# Patient Record
Sex: FEMALE | Race: WHITE | NOT HISPANIC OR LATINO | URBAN - METROPOLITAN AREA
[De-identification: names, ages, dates, MRNs, and addresses within clinical notes are randomized per-mention and may not be internally consistent; named-entity substitution may affect disease eponyms.]

---

## 2019-02-15 ENCOUNTER — EMERGENCY (EMERGENCY)
Facility: HOSPITAL | Age: 69
LOS: 1 days | Discharge: HOME | End: 2019-02-15
Attending: EMERGENCY MEDICINE | Admitting: EMERGENCY MEDICINE
Payer: MEDICARE

## 2019-02-15 VITALS — WEIGHT: 244.05 LBS

## 2019-02-15 VITALS
SYSTOLIC BLOOD PRESSURE: 152 MMHG | HEART RATE: 76 BPM | OXYGEN SATURATION: 97 % | DIASTOLIC BLOOD PRESSURE: 80 MMHG | TEMPERATURE: 98 F | RESPIRATION RATE: 16 BRPM

## 2019-02-15 DIAGNOSIS — Z96.60 PRESENCE OF UNSPECIFIED ORTHOPEDIC JOINT IMPLANT: Chronic | ICD-10-CM

## 2019-02-15 DIAGNOSIS — Z98.49 CATARACT EXTRACTION STATUS, UNSPECIFIED EYE: Chronic | ICD-10-CM

## 2019-02-15 PROCEDURE — 99284 EMERGENCY DEPT VISIT MOD MDM: CPT

## 2019-02-15 PROCEDURE — 73610 X-RAY EXAM OF ANKLE: CPT

## 2019-02-15 PROCEDURE — 73610 X-RAY EXAM OF ANKLE: CPT | Mod: 26,LT

## 2019-02-15 PROCEDURE — 99284 EMERGENCY DEPT VISIT MOD MDM: CPT | Mod: 25

## 2019-02-15 RX ORDER — IBUPROFEN 200 MG
600 TABLET ORAL ONCE
Qty: 0 | Refills: 0 | Status: COMPLETED | OUTPATIENT
Start: 2019-02-15 | End: 2019-02-15

## 2019-02-15 RX ORDER — ACETAMINOPHEN 500 MG
650 TABLET ORAL ONCE
Qty: 0 | Refills: 0 | Status: COMPLETED | OUTPATIENT
Start: 2019-02-15 | End: 2019-02-15

## 2019-02-15 RX ADMIN — Medication 600 MILLIGRAM(S): at 13:26

## 2019-02-15 RX ADMIN — Medication 650 MILLIGRAM(S): at 13:26

## 2019-02-15 NOTE — ED PROVIDER NOTE - MUSCULOSKELETAL, MLM
Spine appears normal, range of motion is not limited, left ankle with ttp over the lateral mall, forefoot tenderness, strength and sensation intact with strong palpable pulses

## 2019-02-15 NOTE — ED PROVIDER NOTE - CLINICAL SUMMARY MEDICAL DECISION MAKING FREE TEXT BOX
67 y/o female who presents to the ED for atraumatic left ankle pain. states she has been taking motrin with no relief. patient reports worsening pain with weight bearing. miminal swelling of ankle with ttp over lateral mall. full strength, sensation and active ROM. will obtain xrays, medicate for pain. suspect sprain, will ace wrap as needed based on imaging - Danyell Watson PA-C

## 2019-02-15 NOTE — ED ADULT NURSE REASSESSMENT NOTE - CONDITION
pt aox4, disch to see ortho feeling better, no numbness, ambulatory good steady gait  pair of crutches ,/improved

## 2019-02-15 NOTE — ED PROVIDER NOTE - PROGRESS NOTE DETAILS
ace wrapped, reviewed findings recommended weight bear as tolerated or use crutches as needed, follow up for persistent pain as may require MRI. - Danyell GRANDAC

## 2019-02-15 NOTE — ED PROVIDER NOTE - OBJECTIVE STATEMENT
left ankle pain for a few days, no injury. 69 y/o female who presents to the ED for atraumatic left ankle pain. states she has been taking motrin with no relief. patient reports worsening pain with weight bearing. patient unsure if she had an inversion injury. no fever/chills/redness. no hx gout.

## 2019-02-15 NOTE — ED PROCEDURE NOTE - ATTENDING CONTRIBUTION TO CARE
I have personally performed a face to face diagnostic evaluation on this patient.  I have reviewed the PA note and agree with the history, exam, and plan of care, except as noted.  History and Exam by me shows I have personally performed a face to face diagnostic evaluation on this patient.  I have reviewed the PA note and agree with the history, exam, and plan of care, except as noted.  History and Exam by me shows  Procedure performed under my general supervision.

## 2019-02-15 NOTE — ED PROVIDER NOTE - NSFOLLOWUPINSTRUCTIONS_ED_ALL_ED_FT
Follow up with your Primary Care Physician within the next 2-3 days  Bring a copy of your test results with you to your appointment  Continue your current medication regimen  Return to the Emergency Room if you experience new or worsening symptoms  Take Motrin 400-600mg every 6 hrs as needed for pain. Take with food   Take Tylenol 650mg every 6 hrs as needed for pain.  Rest. Apply cold pack for 20 minutes multiple times daily. Elevate.

## 2019-02-15 NOTE — ED ADULT NURSE NOTE - NSIMPLEMENTINTERV_GEN_ALL_ED
Implemented All Universal Safety Interventions:  [place] to call system. Call bell, personal items and telephone within reach. Instruct patient to call for assistance. Room bathroom lighting operational. Non-slip footwear when patient is off stretcher. Physically safe environment: no spills, clutter or unnecessary equipment. Stretcher in lowest position, wheels locked, appropriate side rails in place.

## 2019-02-15 NOTE — ED PROVIDER NOTE - CARE PLAN
Principal Discharge DX:	Ankle pain  Assessment and plan of treatment:	Follow up with your Primary Care Physician within the next 2-3 days  Bring a copy of your test results with you to your appointment  Continue your current medication regimen  Return to the Emergency Room if you experience new or worsening symptoms  Take Motrin 400-600mg every 6 hrs as needed for pain. Take with food   Take Tylenol 650mg every 6 hrs as needed for pain.  Rest. Apply cold pack for 20 minutes multiple times daily. Elevate.

## 2019-02-15 NOTE — ED PROVIDER NOTE - ATTENDING CONTRIBUTION TO CARE
I have personally performed a face to face diagnostic evaluation on this patient.  I have reviewed the PA note and agree with the history, exam, and plan of care, except as noted.  History and Exam by me shows  left ankle pain x 3-4 days.  ? injury.  left ankle- no fx

## 2019-02-15 NOTE — ED ADULT NURSE NOTE - PMH
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

## 2019-05-28 ENCOUNTER — CHART COPY (OUTPATIENT)
Age: 69
End: 2019-05-28

## 2020-11-13 ENCOUNTER — EMERGENCY (EMERGENCY)
Facility: HOSPITAL | Age: 70
LOS: 1 days | End: 2020-11-13
Attending: EMERGENCY MEDICINE
Payer: MEDICARE

## 2020-11-13 VITALS
RESPIRATION RATE: 18 BRPM | WEIGHT: 240.08 LBS | OXYGEN SATURATION: 99 % | DIASTOLIC BLOOD PRESSURE: 88 MMHG | HEIGHT: 64 IN | SYSTOLIC BLOOD PRESSURE: 151 MMHG | TEMPERATURE: 96 F | HEART RATE: 75 BPM

## 2020-11-13 VITALS
TEMPERATURE: 98 F | RESPIRATION RATE: 15 BRPM | SYSTOLIC BLOOD PRESSURE: 140 MMHG | HEART RATE: 78 BPM | OXYGEN SATURATION: 98 % | DIASTOLIC BLOOD PRESSURE: 78 MMHG

## 2020-11-13 DIAGNOSIS — Z96.60 PRESENCE OF UNSPECIFIED ORTHOPEDIC JOINT IMPLANT: Chronic | ICD-10-CM

## 2020-11-13 DIAGNOSIS — Z98.49 CATARACT EXTRACTION STATUS, UNSPECIFIED EYE: Chronic | ICD-10-CM

## 2020-11-13 PROCEDURE — 73030 X-RAY EXAM OF SHOULDER: CPT

## 2020-11-13 PROCEDURE — 73080 X-RAY EXAM OF ELBOW: CPT

## 2020-11-13 PROCEDURE — 96374 THER/PROPH/DIAG INJ IV PUSH: CPT | Mod: XU

## 2020-11-13 PROCEDURE — 73030 X-RAY EXAM OF SHOULDER: CPT | Mod: 26,LT

## 2020-11-13 PROCEDURE — 73060 X-RAY EXAM OF HUMERUS: CPT

## 2020-11-13 PROCEDURE — 99284 EMERGENCY DEPT VISIT MOD MDM: CPT

## 2020-11-13 PROCEDURE — 99285 EMERGENCY DEPT VISIT HI MDM: CPT | Mod: 25

## 2020-11-13 PROCEDURE — 73060 X-RAY EXAM OF HUMERUS: CPT | Mod: 26,LT

## 2020-11-13 PROCEDURE — 73080 X-RAY EXAM OF ELBOW: CPT | Mod: 26,LT

## 2020-11-13 PROCEDURE — 23650 CLTX SHO DSLC W/MNPJ WO ANES: CPT | Mod: LT

## 2020-11-13 PROCEDURE — 73020 X-RAY EXAM OF SHOULDER: CPT

## 2020-11-13 PROCEDURE — 96375 TX/PRO/DX INJ NEW DRUG ADDON: CPT | Mod: XU

## 2020-11-13 RX ORDER — ACETAMINOPHEN 500 MG
650 TABLET ORAL ONCE
Refills: 0 | Status: COMPLETED | OUTPATIENT
Start: 2020-11-13 | End: 2020-11-13

## 2020-11-13 RX ORDER — MORPHINE SULFATE 50 MG/1
4 CAPSULE, EXTENDED RELEASE ORAL ONCE
Refills: 0 | Status: DISCONTINUED | OUTPATIENT
Start: 2020-11-13 | End: 2020-11-13

## 2020-11-13 RX ORDER — DIAZEPAM 5 MG
2 TABLET ORAL ONCE
Refills: 0 | Status: DISCONTINUED | OUTPATIENT
Start: 2020-11-13 | End: 2020-11-13

## 2020-11-13 RX ORDER — DIAZEPAM 5 MG
5 TABLET ORAL ONCE
Refills: 0 | Status: DISCONTINUED | OUTPATIENT
Start: 2020-11-13 | End: 2020-11-13

## 2020-11-13 RX ADMIN — MORPHINE SULFATE 4 MILLIGRAM(S): 50 CAPSULE, EXTENDED RELEASE ORAL at 18:00

## 2020-11-13 RX ADMIN — MORPHINE SULFATE 4 MILLIGRAM(S): 50 CAPSULE, EXTENDED RELEASE ORAL at 17:25

## 2020-11-13 RX ADMIN — Medication 2 MILLIGRAM(S): at 17:26

## 2020-11-13 RX ADMIN — Medication 650 MILLIGRAM(S): at 17:25

## 2020-11-13 RX ADMIN — Medication 650 MILLIGRAM(S): at 18:20

## 2020-11-13 NOTE — ED PROVIDER NOTE - OBJECTIVE STATEMENT
68 y/o female with PMHx BIBA due to trip and fall. pt reports she was waking and tripped over an object, she reports she attempted to stop her fall by grabbing on to shelf. pt notes pain to left shoulder. pt describes pain as aching, non-radiating, and currently 10/10. pt denies open wounds, head injury, numbness/weakness, prodromal symptoms, chest pain, SOB, or any other complaints.

## 2020-11-13 NOTE — CONSULT NOTE ADULT - SUBJECTIVE AND OBJECTIVE BOX
69F reports to  ED s/p fall this afternoon onto left arm. PAtient denies any HS/LOC. Denies any numbness tingling.     Vital Signs Last 24 Hrs  T(C): 35.6 (13 Nov 2020 16:25), Max: 35.6 (13 Nov 2020 16:25)  T(F): 96 (13 Nov 2020 16:25), Max: 96 (13 Nov 2020 16:25)  HR: 75 (13 Nov 2020 16:25) (75 - 75)  BP: 151/88 (13 Nov 2020 16:25) (151/88 - 151/88)  BP(mean): --  RR: 18 (13 Nov 2020 16:25) (18 - 18)  SpO2: 99% (13 Nov 2020 16:25) (99% - 99%)    Imaging:   XRay left proximal humerus: Depicts posterior shoulder dislocation, no fracture appreciated      Gen: NAD AAOX3  LUE:  Skin intact  +sensation C5-T1  +nerves anterior interosseus/posterior interosseus/radial/median/ulnar/musculocutaneous  +radial pulse  Soft compartments, - calf tenderness    Secondary Exam: Benign, Skin intact, NTTP along axial spine, SILT throughout, motor grossly intact throughout, no other orthopedic injuries at this time, compartments soft and compressible    Procedure:  Procedure explained and risks, benefits, and alternatives to procedure discussed with patient at bedside. Patient expressed full understanding and all questions were answered. Under aseptic conditions, a hematoma block was administered to the dislocation site using 10cc of 1% lidocaine + 10cc of Normal saline. Closed reduction was performed and abduction sling was applied to the affected extremity. The patient tolerated the procedure well and there we no complications. The patient was neurovascularly intact following reduction. Post-reduction x-rays demonstrated acceptable alignment.

## 2020-11-13 NOTE — ED ADULT NURSE NOTE - CHPI ED NUR SYMPTOMS NEG
no bleeding/no confusion/no fever/no vomiting/no loss of consciousness/no tingling/no weakness/no numbness/no abrasion

## 2020-11-13 NOTE — ED PROVIDER NOTE - PHYSICAL EXAMINATION
Constitutional: Awake, Alert, non-toxic. NAD. Well appearing, well nourished.   HEAD: Normocephalic, atraumatic.   EYES: PERRL, EOM intact, conjunctiva and sclera are clear bilaterally. No raccoon eyes.   ENT: No seymour sign. No rhinorrhea, normal pharynx, patent, no tonsillar exudate or enlargement, mucous membranes pink/moist, no erythema, no drooling or stridor.   NECK: Supple, non-tender  BACK: No midline or paraspinal TTP of cervical/thoracic/lumbar spine, FROM. No ecchymosis or hematomas. no rib TTP.   CARDIOVASCULAR: Normal S1, S2; regular rate and rhythm.  RESPIRATORY: Normal respiratory effort; breath sounds CTAB, no wheezes, rhonchi, or rales. Speaking in full sentences. No accessory muscle use.   ABDOMEN: Soft; non-tender, non-distended.   EXTREMITIES: Full passive and active ROM in all extremities; (+) left mid humerus TTP, (+) minimal shoulder and elbow TTP, ROM intact, no open wounds; distal pulses palpable and symmetric, no edema, no crepitus or step off  SKIN: Warm, dry; good skin turgor, no apparent lesions or rashes, no ecchymosis, brisk capillary refill.  NEURO: A&O x3. Sensory and motor functions are grossly intact. Speech is normal. Appearance and judgement seem appropriate for gender and age. No neurological deficits. Neurovascular sensation intact motor function 5/5 of upper and lower extremities Constitutional: Awake, Alert, non-toxic. NAD. Well appearing, well nourished.   HEAD: Normocephalic, atraumatic.   EYES: PERRL, EOM intact, conjunctiva and sclera are clear bilaterally. No raccoon eyes.   ENT: No seymour sign. No rhinorrhea, normal pharynx, patent, no tonsillar exudate or enlargement, mucous membranes pink/moist, no erythema, no drooling or stridor.   NECK: Supple, non-tender  BACK: No midline or paraspinal TTP of cervical/thoracic/lumbar spine, FROM. No ecchymosis or hematomas. no rib TTP.   CARDIOVASCULAR: Normal S1, S2; regular rate and rhythm.  RESPIRATORY: Normal respiratory effort; breath sounds CTAB, no wheezes, rhonchi, or rales. Speaking in full sentences. No accessory muscle use.   ABDOMEN: Soft; non-tender, non-distended.   EXTREMITIES: Full passive and active ROM in all extremities; (+) left mid humerus TTP, (+) minimal shoulder and elbow TTP, ROM of elbow and wrist intact, no open wounds; distal pulses palpable and symmetric, no edema, no crepitus or step off  SKIN: Warm, dry; good skin turgor, no apparent lesions or rashes, no ecchymosis, brisk capillary refill.  NEURO: A&O x3. Sensory and motor functions are grossly intact. Speech is normal. Appearance and judgement seem appropriate for gender and age. No neurological deficits. Neurovascular sensation intact motor function 5/5 of upper and lower extremities

## 2020-11-13 NOTE — ED PROVIDER NOTE - ATTENDING CONTRIBUTION TO CARE
70 yo female s/p foosh with injury to LUE  pain with movement   no deformity  2+ pulses  sensation intact    xrays

## 2020-11-13 NOTE — ED PROVIDER NOTE - CLINICAL SUMMARY MEDICAL DECISION MAKING FREE TEXT BOX
BIBA due to trip and fall. pt reports she was waking and tripped over an object, she reports she attempted to stop her fall by grabbing on to shelf. c/o left shoulder pain. plan includes xray shoulder r/o fx/dislocation, pain control, re-assess

## 2020-11-13 NOTE — ED PROVIDER NOTE - CARE PROVIDER_API CALL
Grabiel Stephen)  Orthopaedic Surgery Surgery  [address] [place] [address]  Phone: [phone]  Fax: [phone]  Follow Up Time: 4-6 Days

## 2020-11-13 NOTE — ED PROVIDER NOTE - PATIENT PORTAL LINK FT
You can access the FollowMyHealth Patient Portal offered by [facility] by registering at the following website: [email or web address]. By joining [facility]’s FollowMyHealth portal, you will also be able to view your health information using other applications (apps) compatible with our system.

## 2020-11-13 NOTE — ED PROVIDER NOTE - NSFOLLOWUPINSTRUCTIONS_ED_ALL_ED_FT
Follow up with ortho within the week. Return to ED for any worsening pain, numbness/weakness, etc. Continue to use sling    1) Follow-up with Orthopedics, See referred doctor. Call today/next business day for close, prompt follow-up.  2) Return to Emergency room for any worsening or persistent pain, weakness, numbness, fever, color change to extremity, or any other concerning symptoms.  3) Take ibuprofen 600 mg every  6 hours as needed.   4) You can consider discussing with your doctor if physical therapy or further imaging as an MRI may be beneficial.     Shoulder Dislocation    Your shoulder joint is made up of 3 bones:  •The upper arm bone (humerus).      •The shoulder blade (scapula).      •The collarbone (clavicle).      A shoulder dislocation happens when your upper arm bone moves out of its normal place in your shoulder joint.      What are the causes?  This condition is often caused by:  •A fall.      •A hard, direct hit to the shoulder.      •A forceful movement of the shoulder.        What increases the risk?    You are more likely to develop this condition if you play sports.      What are the signs or symptoms?     •Bad shape (deformity) of the shoulder.      •Very bad pain.      •A shoulder that you cannot move.      •Numbness, weakness, or tingling in your neck or down your arm.      •Bruising or swelling around your shoulder.        How is this treated?  This condition is treated with a procedure called a reduction. This is done to place the upper arm bone back in the joint. There are two types of reduction:  •Closed reduction. The upper arm bone is placed back in the joint without surgery. The doctor uses his or her hands to guide the bone back into place.    •Open reduction. Surgery is done to place the upper arm bone back in the joint. This may be needed if:  •You have a weak shoulder joint or weak tissues that connect bones to each other (ligaments).      •You have had more than one shoulder dislocation.      •The nerves or blood vessels around your shoulder have been damaged.        After the procedure, you will wear a brace or sling to prevent the arm from moving.    After the brace or sling is removed, you will have physical therapy to help improve movement (range of motion) in your shoulder joint.      Follow these instructions at home:    Medicines     •Take over-the-counter and prescription medicines only as told by your doctor.    •Ask your doctor if the medicine prescribed to you:   •Requires you to avoid driving or using heavy machinery.     •Can cause trouble pooping (constipation). You may need to take steps to prevent or treat trouble pooping:  •Drink enough fluid to keep your pee (urine) pale yellow.      •Take over-the-counter or prescription medicines.      •Eat foods that are high in fiber. These include beans, whole grains, and fresh fruits and vegetables.       •Limit foods that are high in fat and sugar. These include fried or sweet foods.          If you have a brace or sling:     •Wear the brace or sling as told by your doctor. Remove it only as told by your doctor.    •Loosen the brace or sling if your fingers:  •Tingle.      •Become numb.      •Turn cold or blue.        •Keep the brace or sling clean.    •If the brace or sling is not waterproof:  •Do not let it get wet.      •Cover it with a watertight covering when you take a bath or shower.          Managing pain, stiffness, and swelling    •If told, put ice on the injured area.  •If you can remove your brace or sling, remove it as told by your doctor.      •Put ice in a plastic bag.      •Place a towel between your skin and the bag.      •Leave the ice on for 20 minutes, 2–3 times per day.        •Move your fingers often.      •Raise (elevate) the injured area above the level of your heart while you are sitting or lying down.      Activity     • Do not lift your arm above shoulder level until your doctor approves.      • Do not lift anything until your doctor says that it is safe.      • Do not push or pull things until your doctor approves.      •Return to your normal activities as told by your doctor. Ask your doctor what activities are safe for you.      •Do range-of-motion exercises only as told by your doctor.      •Exercise your hand by squeezing a soft ball. This keeps your hand and wrist from getting stiff and swollen.      General instructions     • Do not drive while you are wearing a brace or sling on a hand that you use for driving.      • Do not take baths, swim, or use a hot tub until your doctor approves. Ask your doctor if you may take showers. You may only be allowed to take sponge baths.      • Do not use any tobacco products, including cigarettes, chewing tobacco, or e-cigarettes. These can delay healing. If you need help quitting, ask your doctor.      •Keep all follow-up visits as told by your doctor. This is important.        Contact a doctor if:    •Your brace or sling gets damaged.        Get help right away if:    •Your pain gets worse, not better.      •You lose feeling in your arm or hand.      •Your arm or hand turns white and cold.        Summary    •A shoulder dislocation happens when your upper arm bone moves out of its normal place in your shoulder joint.      •It is often caused by a fall, a strong hit to the shoulder, or a forceful movement of the shoulder.      •It causes very bad pain. You may not be able to move your shoulder.      •This condition is treated with either closed or open reduction. You will also be given a brace or sling. You will do exercises to improve movement in your shoulder joint.      •Contact a doctor if your brace or sling gets damaged. Get help right away if your pain gets worse, you lose feeling in your arm or hand, or your arm or hand turns white or cold.      This information is not intended to replace advice given to you by your health care provider. Make sure you discuss any questions you have with your health care provider.

## 2020-11-13 NOTE — CONSULT NOTE ADULT - ASSESSMENT
69F with left posterior shoulder dislocation    -Hematoma block preformed and shoulder was relocated at bedside with confirmatory imaging  -Patient was placed in abduction sling  -NWB LUE until seen in office  -Keep LUE in abduction brace  -Ice effected extremity as needed   -OCT NSAID As needed  -FU in Dr. Stephen office in 7 days  -No acute orthopedic surgical intervention needed at this time  -Discussed with attending who agrees with plan  -Ortho stable for discharge

## 2020-11-13 NOTE — ED PROVIDER NOTE - PSH
Axillary cyst removed    Detached retina, right    S/P cataract extraction  bilateral  S/P hip replacement    S/P hip replacement  right 2014  S/P Tubal Ligation

## 2020-11-13 NOTE — ED PROVIDER NOTE - PROGRESS NOTE DETAILS
xray noted shoulder dislocation. Ortho reduced shoulder, advised dc with follow up with dr. Lehman within the week. ER return precautions discussed

## 2020-12-08 ENCOUNTER — APPOINTMENT (OUTPATIENT)
Dept: ORTHOPEDIC SURGERY | Facility: CLINIC | Age: 70
End: 2020-12-08
Payer: MEDICARE

## 2020-12-08 VITALS
HEIGHT: 65 IN | TEMPERATURE: 97.5 F | SYSTOLIC BLOOD PRESSURE: 164 MMHG | BODY MASS INDEX: 38.32 KG/M2 | WEIGHT: 230 LBS | HEART RATE: 80 BPM | DIASTOLIC BLOOD PRESSURE: 124 MMHG

## 2020-12-08 PROCEDURE — 99203 OFFICE O/P NEW LOW 30 MIN: CPT

## 2020-12-09 RX ORDER — ALPRAZOLAM 0.25 MG/1
0.25 TABLET ORAL
Qty: 4 | Refills: 0 | Status: ACTIVE | COMMUNITY
Start: 2020-12-09 | End: 1900-01-01

## 2020-12-12 ENCOUNTER — APPOINTMENT (OUTPATIENT)
Dept: MRI IMAGING | Facility: CLINIC | Age: 70
End: 2020-12-12
Payer: MEDICARE

## 2020-12-12 ENCOUNTER — OUTPATIENT (OUTPATIENT)
Dept: OUTPATIENT SERVICES | Facility: HOSPITAL | Age: 70
LOS: 1 days | End: 2020-12-12
Payer: MEDICARE

## 2020-12-12 DIAGNOSIS — Z96.60 PRESENCE OF UNSPECIFIED ORTHOPEDIC JOINT IMPLANT: Chronic | ICD-10-CM

## 2020-12-12 DIAGNOSIS — M75.122 COMPLETE ROTATOR CUFF TEAR OR RUPTURE OF LEFT SHOULDER, NOT SPECIFIED AS TRAUMATIC: ICD-10-CM

## 2020-12-12 DIAGNOSIS — Z98.49 CATARACT EXTRACTION STATUS, UNSPECIFIED EYE: Chronic | ICD-10-CM

## 2020-12-12 PROCEDURE — 73221 MRI JOINT UPR EXTREM W/O DYE: CPT | Mod: 26,LT

## 2020-12-12 PROCEDURE — 73221 MRI JOINT UPR EXTREM W/O DYE: CPT

## 2020-12-15 ENCOUNTER — APPOINTMENT (OUTPATIENT)
Dept: ORTHOPEDIC SURGERY | Facility: CLINIC | Age: 70
End: 2020-12-15
Payer: MEDICARE

## 2020-12-15 VITALS — SYSTOLIC BLOOD PRESSURE: 186 MMHG | HEART RATE: 86 BPM | DIASTOLIC BLOOD PRESSURE: 79 MMHG | TEMPERATURE: 97.3 F

## 2020-12-15 PROCEDURE — 20610 DRAIN/INJ JOINT/BURSA W/O US: CPT | Mod: LT

## 2020-12-15 PROCEDURE — 99214 OFFICE O/P EST MOD 30 MIN: CPT | Mod: 25

## 2021-01-27 ENCOUNTER — APPOINTMENT (OUTPATIENT)
Dept: ORTHOPEDIC SURGERY | Facility: CLINIC | Age: 71
End: 2021-01-27
Payer: MEDICARE

## 2021-01-27 VITALS — HEIGHT: 65 IN | BODY MASS INDEX: 38.32 KG/M2 | TEMPERATURE: 97.6 F | WEIGHT: 230 LBS

## 2021-01-27 DIAGNOSIS — S46.119A STRAIN OF MUSCLE, FASCIA AND TENDON OF LONG HEAD OF BICEPS, UNSPECIFIED ARM, INITIAL ENCOUNTER: ICD-10-CM

## 2021-01-27 PROCEDURE — 99214 OFFICE O/P EST MOD 30 MIN: CPT

## 2021-02-12 ENCOUNTER — OUTPATIENT (OUTPATIENT)
Dept: OUTPATIENT SERVICES | Facility: HOSPITAL | Age: 71
LOS: 1 days | End: 2021-02-12
Payer: MEDICARE

## 2021-02-12 VITALS
SYSTOLIC BLOOD PRESSURE: 159 MMHG | RESPIRATION RATE: 16 BRPM | TEMPERATURE: 98 F | WEIGHT: 240.08 LBS | DIASTOLIC BLOOD PRESSURE: 65 MMHG | HEIGHT: 63 IN | OXYGEN SATURATION: 100 % | HEART RATE: 74 BPM

## 2021-02-12 DIAGNOSIS — M75.122 COMPLETE ROTATOR CUFF TEAR OR RUPTURE OF LEFT SHOULDER, NOT SPECIFIED AS TRAUMATIC: ICD-10-CM

## 2021-02-12 DIAGNOSIS — Z01.818 ENCOUNTER FOR OTHER PREPROCEDURAL EXAMINATION: ICD-10-CM

## 2021-02-12 DIAGNOSIS — Z98.49 CATARACT EXTRACTION STATUS, UNSPECIFIED EYE: Chronic | ICD-10-CM

## 2021-02-12 DIAGNOSIS — S46.119A STRAIN OF MUSCLE, FASCIA AND TENDON OF LONG HEAD OF BICEPS, UNSPECIFIED ARM, INITIAL ENCOUNTER: ICD-10-CM

## 2021-02-12 DIAGNOSIS — Z96.642 PRESENCE OF LEFT ARTIFICIAL HIP JOINT: Chronic | ICD-10-CM

## 2021-02-12 DIAGNOSIS — Z96.641 PRESENCE OF RIGHT ARTIFICIAL HIP JOINT: Chronic | ICD-10-CM

## 2021-02-12 NOTE — H&P PST ADULT - NSICDXPASTSURGICALHX_GEN_ALL_CORE_FT
PAST SURGICAL HISTORY:  Axillary cyst removed right 1970    Detached retina, right 2011    S/P cataract extraction bilateral 2012    S/P hip replacement, left August 2014    S/P hip replacement, right May,2014    S/P Tubal Ligation 1986

## 2021-02-12 NOTE — H&P PST ADULT - NSICDXFAMILYHX_GEN_ALL_CORE_FT
FAMILY HISTORY:  Father  Still living? No  Family history of MS (multiple sclerosis), Age at diagnosis: Age Unknown    Mother  Still living? Unknown  Family history of dementia, Age at diagnosis: Age Unknown

## 2021-02-12 NOTE — H&P PST ADULT - PRO PAIN EXPRESSION
PROCEDURE:   IUD (Intrauterine device) removal     REASON FOR REMOVAL:       HISTORY AND PRESENT ILLNESS:   Patient Does not have any concerns at point in time.      PROCEDURE NOTE:   IUD removal     INDICATION:       ALLERGIES:   Allergen Reactions   •         MEDICATIONS:    LAST MENSTRUAL PERIOD:   Patient Does have menstrual periods since using the IUD     PROCEDURE:   Procedure discussed with the patient including risks and benefits which include discomfort, pain, infection, bleeding.  She fully understood the procedure and consented for the procedure.    IUD string was visible.  Using the ring forceps, IUD strings were removed without any problems.  The patient tolerated the procedure very well.     What to expect after the procedure was discussed at length with her.  She has full understanding of that.  Advised if she noticed any cramping, she should notify me.  All plans discussed with her.  She understood and agreed.     Follow up and further contraceptive options discussed with the patient.  Patient has decided to go with  method of contraception.  She was provided with condoms for back up for use in the next 7 days.       PROCEDURE:  Uterus examined and noted to be non-enlarged, no adnexal masses noted.  Speculum inserted.  Cervix cleansed with Betadine and cervix grasped with single tooth tenaculum.  Uterus sounded to 6 cm.  Mirena IUD inserted without difficulty under sterile conditions without complication.  Strings cut to 3 cm long.  All instruments removed.  Minimal bleeding from tenaculum sites with good hemostasis.    ASSESSMENT:  Mirena IUD (intrauterine device) placement.   LOT # [id number]  , EXP 05/2022     PLAN:  Instructed on how to check her IUD strings.  Check strings monthly to be reassured her IUD is in the proper position and to call the office if she cannot locate the strings.  Mirena product brochure given.  Schedule follow-up for IUD check in 4-6 weeks.  Instructed to call if having  fever, severe bleeding or severe pain.    [place] CNM     facial expression/grimacing/guarding

## 2021-02-12 NOTE — H&P PST ADULT - HISTORY OF PRESENT ILLNESS
This is a 69 y/o female who injured her left shoulder s/p fall on November,2020 with complaint of  worsening left shoulder pain and pain with ROM. Tried PT and serioid injection with mild relief , however pain continued. MRI showed rotator cuff tear . scheduled for left shoulder arthroscopy 2/26/21 This is a 71 y/o female who injured her left shoulder s/p fall on November,2020 with complaint of  worsening left shoulder pain and pain with ROM. Tried PT and sterioid injection with mild relief , however pain continued. MRI showed rotator cuff tear . scheduled for left shoulder arthroscopy 2/26/21

## 2021-02-12 NOTE — H&P PST ADULT - NSICDXPASTMEDICALHX_GEN_ALL_CORE_FT
PAST MEDICAL HISTORY:  HLD (hyperlipidemia)     HTN (hypertension)      PAST MEDICAL HISTORY:  HLD (hyperlipidemia)     HTN (hypertension)     Morbid obesity with BMI of 40.0-44.9, adult BMI 43

## 2021-02-12 NOTE — H&P PST ADULT - ASSESSMENT
^11 y/o female with left rotator cuff tear   scheduled for left shoulder arthroscopy on 2/26/21   Medical clearance   Pre op instructions   COVID testing on 2/24/21 at 11;30 am

## 2021-02-12 NOTE — H&P PST ADULT - SOURCE OF INFORMATION, PROFILE
medical history obtained via telephone as per COVID protocol. .physical exam to be done on DOS/patient

## 2021-02-12 NOTE — H&P PST ADULT - NSANTHOSAYNRD_GEN_A_CORE
No. BRIT screening performed.  STOP BANG Legend: 0-2 = LOW Risk; 3-4 = INTERMEDIATE Risk; 5-8 = HIGH Risk

## 2021-02-17 PROCEDURE — G0463: CPT

## 2021-02-18 PROBLEM — E66.01 MORBID (SEVERE) OBESITY DUE TO EXCESS CALORIES: Chronic | Status: ACTIVE | Noted: 2021-02-12

## 2021-02-18 PROBLEM — E78.5 HYPERLIPIDEMIA, UNSPECIFIED: Chronic | Status: ACTIVE | Noted: 2021-02-12

## 2021-02-24 ENCOUNTER — OUTPATIENT (OUTPATIENT)
Dept: OUTPATIENT SERVICES | Facility: HOSPITAL | Age: 71
LOS: 1 days | End: 2021-02-24
Payer: MEDICARE

## 2021-02-24 DIAGNOSIS — Z96.641 PRESENCE OF RIGHT ARTIFICIAL HIP JOINT: Chronic | ICD-10-CM

## 2021-02-24 DIAGNOSIS — Z98.49 CATARACT EXTRACTION STATUS, UNSPECIFIED EYE: Chronic | ICD-10-CM

## 2021-02-24 DIAGNOSIS — Z96.642 PRESENCE OF LEFT ARTIFICIAL HIP JOINT: Chronic | ICD-10-CM

## 2021-02-24 DIAGNOSIS — Z20.828 CONTACT WITH AND (SUSPECTED) EXPOSURE TO OTHER VIRAL COMMUNICABLE DISEASES: ICD-10-CM

## 2021-02-24 LAB — SARS-COV-2 RNA SPEC QL NAA+PROBE: SIGNIFICANT CHANGE UP

## 2021-02-24 PROCEDURE — U0003: CPT

## 2021-02-24 PROCEDURE — U0005: CPT

## 2021-02-25 ENCOUNTER — TRANSCRIPTION ENCOUNTER (OUTPATIENT)
Age: 71
End: 2021-02-25

## 2021-02-25 NOTE — ASU PATIENT PROFILE, ADULT - PSH
Axillary cyst removed  right 1970  Detached retina, right  2011  S/P cataract extraction  bilateral 2012  S/P hip replacement, left  August 2014  S/P hip replacement, right  May,2014  S/P Tubal Ligation  1986

## 2021-02-25 NOTE — ASU PATIENT PROFILE, ADULT - ANESTHESIA, PREVIOUS REACTION, PROFILE
Spoke with patient and informed her again that there are no appointments available on the days she requested. Patient was informed that PROZAC was called into the pharmacy for her depression. Patient requesting to discuss an O2 machine. Patient also had questions about labs from other Dr's. Patient was instructed to speak to the Dr at her appointment with concerns about her O2 and to contact the Dr's office's that placed the orders for the labs for the results. Patient placed on wait list for earlier appointment. No further questions or concerns.   none

## 2021-02-26 ENCOUNTER — APPOINTMENT (OUTPATIENT)
Dept: ORTHOPEDIC SURGERY | Facility: HOSPITAL | Age: 71
End: 2021-02-26

## 2021-02-26 ENCOUNTER — RESULT REVIEW (OUTPATIENT)
Age: 71
End: 2021-02-26

## 2021-02-26 ENCOUNTER — OUTPATIENT (OUTPATIENT)
Dept: OUTPATIENT SERVICES | Facility: HOSPITAL | Age: 71
LOS: 1 days | End: 2021-02-26
Payer: MEDICARE

## 2021-02-26 VITALS
HEIGHT: 62 IN | HEART RATE: 74 BPM | DIASTOLIC BLOOD PRESSURE: 65 MMHG | SYSTOLIC BLOOD PRESSURE: 159 MMHG | TEMPERATURE: 98 F | RESPIRATION RATE: 16 BRPM | WEIGHT: 240.08 LBS | OXYGEN SATURATION: 100 %

## 2021-02-26 VITALS
HEART RATE: 61 BPM | OXYGEN SATURATION: 99 % | RESPIRATION RATE: 20 BRPM | SYSTOLIC BLOOD PRESSURE: 108 MMHG | DIASTOLIC BLOOD PRESSURE: 94 MMHG

## 2021-02-26 DIAGNOSIS — Z98.49 CATARACT EXTRACTION STATUS, UNSPECIFIED EYE: Chronic | ICD-10-CM

## 2021-02-26 DIAGNOSIS — Z96.642 PRESENCE OF LEFT ARTIFICIAL HIP JOINT: Chronic | ICD-10-CM

## 2021-02-26 DIAGNOSIS — S46.119A STRAIN OF MUSCLE, FASCIA AND TENDON OF LONG HEAD OF BICEPS, UNSPECIFIED ARM, INITIAL ENCOUNTER: ICD-10-CM

## 2021-02-26 DIAGNOSIS — Z96.641 PRESENCE OF RIGHT ARTIFICIAL HIP JOINT: Chronic | ICD-10-CM

## 2021-02-26 DIAGNOSIS — M75.122 COMPLETE ROTATOR CUFF TEAR OR RUPTURE OF LEFT SHOULDER, NOT SPECIFIED AS TRAUMATIC: ICD-10-CM

## 2021-02-26 PROCEDURE — 29823 SHO ARTHRS SRG XTNSV DBRDMT: CPT | Mod: LT

## 2021-02-26 PROCEDURE — 88304 TISSUE EXAM BY PATHOLOGIST: CPT | Mod: 26

## 2021-02-26 PROCEDURE — 29826 SHO ARTHRS SRG DECOMPRESSION: CPT | Mod: LT

## 2021-02-26 PROCEDURE — 88304 TISSUE EXAM BY PATHOLOGIST: CPT

## 2021-02-26 RX ORDER — SODIUM CHLORIDE 9 MG/ML
1000 INJECTION, SOLUTION INTRAVENOUS
Refills: 0 | Status: DISCONTINUED | OUTPATIENT
Start: 2021-02-26 | End: 2021-02-26

## 2021-02-26 RX ORDER — OXYCODONE AND ACETAMINOPHEN 5; 325 MG/1; MG/1
1 TABLET ORAL ONCE
Refills: 0 | Status: DISCONTINUED | OUTPATIENT
Start: 2021-02-26 | End: 2021-02-26

## 2021-02-26 RX ORDER — HYDROMORPHONE HYDROCHLORIDE 2 MG/ML
0.5 INJECTION INTRAMUSCULAR; INTRAVENOUS; SUBCUTANEOUS
Refills: 0 | Status: DISCONTINUED | OUTPATIENT
Start: 2021-02-26 | End: 2021-02-26

## 2021-02-26 RX ORDER — APREPITANT 80 MG/1
40 CAPSULE ORAL ONCE
Refills: 0 | Status: COMPLETED | OUTPATIENT
Start: 2021-02-26 | End: 2021-02-26

## 2021-02-26 RX ORDER — ACETAMINOPHEN 500 MG
1000 TABLET ORAL ONCE
Refills: 0 | Status: DISCONTINUED | OUTPATIENT
Start: 2021-02-26 | End: 2021-02-26

## 2021-02-26 RX ORDER — CEFAZOLIN SODIUM 1 G
2000 VIAL (EA) INJECTION ONCE
Refills: 0 | Status: COMPLETED | OUTPATIENT
Start: 2021-02-26 | End: 2021-02-26

## 2021-02-26 RX ORDER — OXYCODONE AND ACETAMINOPHEN 5; 325 MG/1; MG/1
1 TABLET ORAL
Qty: 42 | Refills: 0
Start: 2021-02-26 | End: 2021-03-04

## 2021-02-26 RX ORDER — ONDANSETRON 8 MG/1
4 TABLET, FILM COATED ORAL ONCE
Refills: 0 | Status: DISCONTINUED | OUTPATIENT
Start: 2021-02-26 | End: 2021-02-26

## 2021-02-26 RX ORDER — CHLORHEXIDINE GLUCONATE 213 G/1000ML
1 SOLUTION TOPICAL ONCE
Refills: 0 | Status: COMPLETED | OUTPATIENT
Start: 2021-02-26 | End: 2021-02-26

## 2021-02-26 RX ADMIN — APREPITANT 40 MILLIGRAM(S): 80 CAPSULE ORAL at 06:28

## 2021-02-26 RX ADMIN — CHLORHEXIDINE GLUCONATE 1 APPLICATION(S): 213 SOLUTION TOPICAL at 06:28

## 2021-02-26 NOTE — ASU DISCHARGE PLAN (ADULT/PEDIATRIC) - ASU DC SPECIAL INSTRUCTIONSFT
FOLLOW enclosed shoulder arthroscopy brochure.  Call MD for severe pain/fever/chills  Ice to shoulder 20 minutes on and off the first 48 hours after surgery to help decrease pain/swelling  Keep shoulder elevated in sling while out of the house.  May remove while at home and awake.  Flex and extend elbow to prevent stiffness when out of sling.  Pump fists 10x an hour to help with circulation  Your most comfortable sleeping position will be propped up  Pain medication as needed, take a stool softener to decrease constipation

## 2021-03-09 ENCOUNTER — APPOINTMENT (OUTPATIENT)
Dept: ORTHOPEDIC SURGERY | Facility: CLINIC | Age: 71
End: 2021-03-09
Payer: MEDICARE

## 2021-03-09 DIAGNOSIS — M19.011 PRIMARY OSTEOARTHRITIS, RIGHT SHOULDER: ICD-10-CM

## 2021-03-09 PROCEDURE — 99024 POSTOP FOLLOW-UP VISIT: CPT

## 2021-03-09 PROCEDURE — 73030 X-RAY EXAM OF SHOULDER: CPT | Mod: LT

## 2021-03-13 ENCOUNTER — EMERGENCY (EMERGENCY)
Facility: HOSPITAL | Age: 71
LOS: 1 days | Discharge: HOME | End: 2021-03-13
Attending: EMERGENCY MEDICINE | Admitting: EMERGENCY MEDICINE
Payer: MEDICARE

## 2021-03-13 VITALS
TEMPERATURE: 98 F | RESPIRATION RATE: 16 BRPM | DIASTOLIC BLOOD PRESSURE: 75 MMHG | SYSTOLIC BLOOD PRESSURE: 148 MMHG | HEART RATE: 74 BPM | OXYGEN SATURATION: 99 %

## 2021-03-13 VITALS
RESPIRATION RATE: 16 BRPM | HEART RATE: 75 BPM | OXYGEN SATURATION: 100 % | TEMPERATURE: 98 F | WEIGHT: 235.89 LBS | SYSTOLIC BLOOD PRESSURE: 166 MMHG | DIASTOLIC BLOOD PRESSURE: 78 MMHG | HEIGHT: 62 IN

## 2021-03-13 DIAGNOSIS — Z98.49 CATARACT EXTRACTION STATUS, UNSPECIFIED EYE: Chronic | ICD-10-CM

## 2021-03-13 DIAGNOSIS — Z96.641 PRESENCE OF RIGHT ARTIFICIAL HIP JOINT: Chronic | ICD-10-CM

## 2021-03-13 DIAGNOSIS — Z96.642 PRESENCE OF LEFT ARTIFICIAL HIP JOINT: Chronic | ICD-10-CM

## 2021-03-13 PROCEDURE — 93971 EXTREMITY STUDY: CPT

## 2021-03-13 PROCEDURE — 93971 EXTREMITY STUDY: CPT | Mod: 26,LT

## 2021-03-13 PROCEDURE — 99284 EMERGENCY DEPT VISIT MOD MDM: CPT

## 2021-03-13 PROCEDURE — 99284 EMERGENCY DEPT VISIT MOD MDM: CPT | Mod: 25

## 2021-03-13 RX ORDER — IBUPROFEN 200 MG
1 TABLET ORAL
Qty: 40 | Refills: 0
Start: 2021-03-13 | End: 2021-03-22

## 2021-03-13 RX ORDER — CHOLECALCIFEROL (VITAMIN D3) 125 MCG
2 CAPSULE ORAL
Qty: 0 | Refills: 0 | DISCHARGE

## 2021-03-13 RX ORDER — LOSARTAN POTASSIUM 100 MG/1
1 TABLET, FILM COATED ORAL
Qty: 0 | Refills: 0 | DISCHARGE

## 2021-03-13 NOTE — ED PROVIDER NOTE - PHYSICAL EXAMINATION
healing ecchymosis noted to left shoulder from recent surgery no evidence of superimposed infection or cellulitis  Right calf distal medial and anterior aspect redness and increased warmth noted with a hard circular 1 cm nodule to medial aspect no TTP. +pedal pulse sensation grossly intact cap refill less then 2 seconds.

## 2021-03-13 NOTE — ED PROVIDER NOTE - NSFOLLOWUPINSTRUCTIONS_ED_ALL_ED_FT
Return to the ED for any new for any new or worsening symptoms  Take your medication as prescribed  Motrin 1 tab every 6 hours for inflammation  Elevate leg to reduce swelling  Warm compresses to affected region on 20 min off 40 min  Compression stockings for comfort   Follow up with vascular call on Monday to schedule appointment. You will likely need repeat ultrasound

## 2021-03-13 NOTE — ED PROVIDER NOTE - CLINICAL SUMMARY MEDICAL DECISION MAKING FREE TEXT BOX
Pt is a 69 yo female who presents to the ED with a cc of a red swollen leg. PMHx of HTN. HLD. Pt reports that approx 2 week ago she was suppose to undergo left shoulder rotator cuff repair. Pt did proceed with the procedure but was told that there was "too much" damage for the repair and so she states that her shoulder was "cleaned out" and she will have to schedule a replacement in the future. Over the last week pt reports that she has noted some redness and a nodule to her left lower leg. Initially she thought that she may have bruised her leg although she cannot recall a trauma but she reports no improvement and so she came to the ED. Denies pain at site. Denies numbness or tingling to ext. Pt has had no prior similar episodes. She is not on AC. Denies fever, chills, N/V, CP, SOB, abd pain. Pt denies h/o PE or DVT. No recent long car rides or trips Pt is a 71 yo female who presents to the ED with a cc of a red swollen leg. PMHx of HTN. HLD. Pt reports that approx 2 week ago she was suppose to undergo left shoulder rotator cuff repair. Pt did proceed with the procedure but was told that there was "too much" damage for the repair and so she states that her shoulder was "cleaned out" and she will have to schedule a replacement in the future. Over the last week pt reports that she has noted some redness and a nodule to her left lower leg. Initially she thought that she may have bruised her leg although she cannot recall a trauma but she reports no improvement and so she came to the ED. Denies pain at site. Denies numbness or tingling to ext. Pt has had no prior similar episodes. She is not on AC. Denies fever, chills, N/V, CP, SOB, abd pain. Pt denies h/o PE or DVT. No recent long car rides or trips. Pt with right lower leg redness and nodule high suspicion for thrombus superficial vs deep. Will obtain US and monitor

## 2021-03-13 NOTE — ED PROVIDER NOTE - CARE PROVIDER_API CALL
ManvarSingh, Pallavi B (MD)  Vascular Surgery  [address] [place] [address], [address] [place] [address]  Phone: [phone]  Fax: [phone]  Follow Up Time:

## 2021-03-13 NOTE — ED ADULT TRIAGE NOTE - CHIEF COMPLAINT QUOTE
" I lump and redness on my left lower leg for a week now " " I have a lump and redness on my left lower leg for a week now "

## 2021-03-13 NOTE — ED PROVIDER NOTE - OBJECTIVE STATEMENT
Pt is a 71 yo female who presents to the ED with a cc of a red swollen leg. PMHx of HTN. HLD. Pt reports that approx 2 week ago she was suppose to undergo left shoulder rotator cuff repair. Pt did proceed with the procedure but was told that there was "too much" damage for the repair and so she states that her shoulder was "cleaned out" and she will have to schedule a replacement in the future. Over the last week pt reports that she has noted some redness and a nodule to her left lower leg. Initially she thought that she may have bruised her leg although she cannot recall a trauma but she reports no improvement and so she came to the ED. Denies pain at site. Denies numbness or tingling to ext. Pt has had no prior similar episodes. She is not on AC. Denies fever, chills, N/V, CP, SOB, abd pain. Pt denies h/o PE or DVT. No recent long car rides or trips

## 2021-03-13 NOTE — ED PROVIDER NOTE - PROGRESS NOTE DETAILS
pt with superficial thrombophlebitis. Results reviewed. Low suspicion for superimposed infection. Advised treatment with NASIDs, warm compresses, elevation, and compression stockings. Pt advised will likely need follow up with vascular and repeat US to monitor progression

## 2021-03-13 NOTE — ED ADULT NURSE NOTE - OBJECTIVE STATEMENT
pt comes to ED c/o LLE lump / mass with swelling x 1 week ago. concerned for blood clot. pt has L shoulder arthroscopy done 2 weeks ago. pt unsure if its due to her banging leg on the shower tub. .denies any recent travel, smoking, chest pain, sob or any other complaints. + distal pulses, good color and warmth.

## 2021-03-13 NOTE — ED PROVIDER NOTE - PATIENT PORTAL LINK FT
You can access the FollowMyHealth Patient Portal offered by [facility] by registering at the following website: [email or web address]. By joining [facility]’s FollowMyHealth portal, you will also be able to view your health information using other applications (apps) compatible with our system.

## 2021-03-14 NOTE — ED POST DISCHARGE NOTE - RESULT SUMMARY
pt reports that she has been elevating her leg, applying warm compresses and taking the Motrin and has noted improvement. Will follow up with vascular next week

## 2021-04-20 ENCOUNTER — APPOINTMENT (OUTPATIENT)
Dept: ORTHOPEDIC SURGERY | Facility: CLINIC | Age: 71
End: 2021-04-20
Payer: MEDICARE

## 2021-04-20 VITALS — HEIGHT: 65 IN | TEMPERATURE: 97.7 F | WEIGHT: 230 LBS | BODY MASS INDEX: 38.32 KG/M2

## 2021-04-20 DIAGNOSIS — M19.012 PRIMARY OSTEOARTHRITIS, LEFT SHOULDER: ICD-10-CM

## 2021-04-20 DIAGNOSIS — Z98.890 OTHER SPECIFIED POSTPROCEDURAL STATES: ICD-10-CM

## 2021-04-20 DIAGNOSIS — M75.122 COMPLETE ROTATOR CUFF TEAR OR RUPTURE OF LEFT SHOULDER, NOT SPECIFIED AS TRAUMATIC: ICD-10-CM

## 2021-04-20 PROCEDURE — 99024 POSTOP FOLLOW-UP VISIT: CPT

## 2021-09-14 RX ORDER — AMOXICILLIN 500 MG/1
500 CAPSULE ORAL
Qty: 12 | Refills: 2 | Status: DISCONTINUED | COMMUNITY
Start: 2019-05-28 | End: 2021-09-14

## 2021-09-14 RX ORDER — AMOXICILLIN 500 MG/1
500 TABLET, FILM COATED ORAL
Qty: 8 | Refills: 4 | Status: ACTIVE | COMMUNITY
Start: 2021-09-14 | End: 1900-01-01

## 2021-09-16 RX ORDER — AMOXICILLIN 500 MG/1
500 TABLET, FILM COATED ORAL
Qty: 20 | Refills: 2 | Status: ACTIVE | COMMUNITY
Start: 2021-09-16 | End: 1900-01-01

## 2022-11-02 NOTE — PRE-OP CHECKLIST - ANTIBIOTIC
Hpi Title: Evaluation of Skin Lesions How Severe Are Your Spot(S)?: moderate Have Your Spot(S) Been Treated In The Past?: has not been treated yes

## 2024-04-08 NOTE — ASU DISCHARGE PLAN (ADULT/PEDIATRIC) - DISCHARGE PLAN IS COMPLETE AND GIVEN TO PATIENT
Hematology/Oncology Inpatient Progress Note    PATIENT NAME: Loyda Tirado  : 1937  MRN: [id number]    Chief complaint: Hypertension,     History of present illness:    2023: In the office brought by her daughter in her wheel chair. She suffered an ischemic stroke sometime in  and it resulted in left hemiplegia. She also suffered from dementia and needed around the clock attention from her family. This dementia tended to become more evident at the end of the day. She had been known for some time to have leukocytosis. Close to the time of this visit the total white blood cell count seemed to increase. She had not developed any new symptoms. She had been eating reasonably well and her weight had been stable. She had been afebrile and had experienced, only occasionally, nocturnal diaphoresis. She denied dyspnea. She had no dysphagia. She had been free of abdominal pain. Denied diarrhea or constipation. Complained of urinary incontinence since the time of the stroke. No edema as long as she kept her lower extremities elevated. No skin rash. The exam did not suggest lymphadenopathy, hepatomegaly or splenomegaly. The laboratory exams indeed demonstrated leukocytosis and she had significant absolute lymphocytosis. Given the apparent chronicity of the process and her lack of symptoms the possibility of chronic lymphocytic leukemia was discussed with her. At the time of the visit she did not seem to require treatment.      2023: Back to review results.  Without new symptoms.  As active as before, doing physical therapy at least once a week.  Eating just as well.  Admitted to moderate nocturnal diaphoresis which was almost daily however she had had no weight loss or fevers.  The exam was unchanged and she did not seem to have splenomegaly though the physical exam was difficult.  The laboratory exams confirmed a chronic lymphocytic leukemia.  A decision was made to investigate further with FISH and  IgVH panel for the prognosis of CLL as well as to rule out hemolysis to explain the anemia.     4/27/2023: Without new symptoms.  In a wheelchair.  Conversant and in good spirits.  The laboratory exams were essentially unchanged.  On exam slender and chronically ill-appearing.  No distress.  Pale and no jaundice.  Respirations not labored.  Lungs clear.  Abdomen soft.  No edema.  Because her family felt that she was developing a urinary tract infection she was asked to bring a urine sample for urinalysis and culture.  FISH panel for CLL prognosis was requested again as it was not done at the time of the last visit.     7/27/2023: Feeling reasonably well though she spent several days in the hospital with altered mental status that was attributed to a urinary tract infection.  She was treated with antibiotics that continued even as outpatient.  At the time of this visit she was essentially asymptomatic.  Her family reported that she had continued to have nocturnal diaphoresis with perhaps 2 episodes per week.  On some of those she can wake up with her close well.  She has resumed her normal routine and has been eating well.  She has been afebrile and has not seem to lose any weight.  She denied any chest pains and had not had any dyspnea.  Also had maintain regular bowel activity and had no dysuria.  On exam there were no changes.  The laboratory exams again revealed a white cell count of 15,300/mm³ with lymphocytic predominance. The count, however, was not any higher than usual.  As well she persisted with normocytic anemia with a hemoglobin of 10.7 g/dL and mean corpuscular volume of 94.6 fL.  FISH panel for prognostic indications reported 38% of nuclei positive for the 13 qdeletion and 12% of nuclei positive for the T p53 gene deletion.A decision was made to continue to observe and she was asked to return 6 months later.     1/25/2024: Without new symptoms.  Again transported in a wheelchair and not able to move  much.  Generally feeling well.  Able to eat and does not seem to have lost weight though waiting her is very difficult.  She has been free of fevers and she has nocturnal diaphoresis only occasionally.  Continues to receive care for a few squamous cell carcinomas of the skin.  On exam chronically ill-appearing woman transported in a wheelchair.  She is oriented, in good spirits and conversant.  No jaundice or pallor.  Lungs clear bilaterally and heart regular.  No edema.  The laboratory exams reveal a blood count that is very similar to before except that her hemoglobin is better than the last time.  The white cell count persist elevated at the expense of lymphocytosis.  To continue observation only and will see me in 6 months.    4/5/2024: Admitted to the hospital with hypertension and ongoing confusion. Found to have leukocytosis with lymphocytosis, to a slightly greater degree than before.     Subjective   4/8/2024: Feeling about the same. Her daughter notes that she continues to be confused.     ROS:  Review of Systems   Constitutional:  Negative for fatigue and fever.   HENT:  Negative for congestion and nosebleeds.    Eyes:  Negative for pain.   Respiratory:  Negative for cough, chest tightness and shortness of breath.    Cardiovascular:  Negative for chest pain.   Gastrointestinal:  Negative for abdominal pain, blood in stool, diarrhea, nausea and vomiting.   Endocrine: Negative for cold intolerance and heat intolerance.   Genitourinary:  Negative for difficulty urinating.   Musculoskeletal:  Negative for arthralgias.   Skin:  Negative for rash.   Neurological:  Negative for dizziness and headaches.   Hematological:  Does not bruise/bleed easily.   Psychiatric/Behavioral:  Positive for confusion. Negative for behavioral problems.       MEDICATIONS:    Scheduled Meds:  amLODIPine, 5 mg, Oral, Q24H  amoxicillin, 500 mg, Oral, Q12H  atorvastatin, 40 mg, Oral, Daily  buPROPion, 75 mg, Oral, Q12H  FLUoxetine, 40  "mg, Oral, Daily  heparin (porcine), 5,000 Units, Subcutaneous, Q12H  melatonin, 5 mg, Oral, Nightly  memantine, 5 mg, Oral, BID  nebivolol, 10 mg, Oral, Daily  pantoprazole, 40 mg, Intravenous, Q AM  sodium chloride, 10 mL, Intravenous, Q12H       Continuous Infusions:  sodium chloride, 75 mL/hr, Last Rate: 75 mL/hr (04/08/24 1550)       PRN Meds:    acetaminophen    ALPRAZolam    senna-docusate sodium **AND** polyethylene glycol **AND** bisacodyl **AND** bisacodyl    Calcium Replacement - Follow Nurse / BPA Driven Protocol    HYDROcodone-acetaminophen    Magnesium Standard Dose Replacement - Follow Nurse / BPA Driven Protocol    melatonin    ondansetron    Phosphorus Replacement - Follow Nurse / BPA Driven Protocol    Potassium Replacement - Follow Nurse / BPA Driven Protocol    [COMPLETED] Insert Peripheral IV **AND** sodium chloride    sodium chloride    sodium chloride     ALLERGIES:    Allergies   Allergen Reactions    Methadone Hcl Anaphylaxis     Objective    VITALS:   /67 (BP Location: Right arm, Patient Position: Lying)   Pulse 73   Temp 97.5 °F (36.4 °C) (Temporal)   Resp 18   Ht 165.1 cm (65\")   Wt 44.7 kg (98 lb 8.7 oz)   SpO2 95%   BMI 16.40 kg/m²     PHYSICAL EXAM: (performed by MD)  Physical Exam  Constitutional:       Appearance: Normal appearance.   HENT:      Head: Normocephalic and atraumatic.   Eyes:      Pupils: Pupils are equal, round, and reactive to light.   Cardiovascular:      Rate and Rhythm: Normal rate and regular rhythm.      Pulses: Normal pulses.      Heart sounds: No murmur heard.  Pulmonary:      Effort: Pulmonary effort is normal.      Breath sounds: Normal breath sounds.   Abdominal:      General: There is no distension.      Palpations: Abdomen is soft. There is no mass.      Tenderness: There is no abdominal tenderness.   Musculoskeletal:         General: Normal range of motion.      Cervical back: Normal range of motion.   Skin:     General: Skin is warm. "   Neurological:      Mental Status: She is alert. She is disoriented.   Psychiatric:         Mood and Affect: Mood normal.         RECENT LABS:  Lab Results (last 24 hours)       Procedure Component Value Units Date/Time    Blood Culture - Blood, Hand, Right [[id number]]  (Normal) Collected: 04/05/24 1532    Specimen: Blood from Hand, Right Updated: 04/08/24 1545     Blood Culture No growth at 3 days    Blood Culture - Blood, Arm, Right [[id number]]  (Normal) Collected: 04/05/24 1532    Specimen: Blood from Arm, Right Updated: 04/08/24 1545     Blood Culture No growth at 3 days    Sodium [[id number]]  (Normal) Collected: 04/07/24 2038    Specimen: Blood from Arm, Right Updated: 04/07/24 2105     Sodium 143 mmol/L           IMAGING REVIEWED:  MRI Brain With & Without Contrast    Result Date: 4/8/2024  Impression: 1. No acute intracranial abnormality. 2. Incidental 9 mm meningioma within the left occipital region. 3. Moderate generalized parenchymal volume loss and changes of chronic small vessel ischemic disease. 4. Acute right maxillary sinusitis. There is also partial opacification of the mastoid air cells right greater than left. Electronically Signed: Juan Antonio Mendieta MD  4/8/2024 7:56 AM EDT  Workstation ID: [id number]    US Renal Bilateral    Result Date: 4/8/2024  1. Moderate chronic dilation of the renal pelvis bilaterally similar to the prior study and studies dating back to at least the CT from 7/2/2022. Evaluation of current obstructive process is indeterminate but relative stability suggest a chronic process.  Follow-up can be obtained as clinically indicated 2. Nonvisualization urinary bladder Electronically Signed: Pino Armenta MD  4/8/2024 5:32 AM EDT  Workstation ID: [id number]    CT Abdomen Pelvis Stone Protocol    Result Date: 4/7/2024  Bilateral hydroureteronephrosis. Asymmetrically thick-walled urinary bladder. Underlying bladder mass not excluded. Electronically Signed: Maikol Mcdonald MD  4/7/2024 5:58  PM EDT  Workstation ID: [id number]     Assessment & Plan       Chronic lymphocytic leukemia. No indication for treatment. In addition her progressive dementia is a strong argument against treatment. Discussed with her daughter at length. Started a conversation about goals with her daughter. At this time no intervention.   Reviewed the MRI: No suggestion of a neoplastic process. No new ischemic event.   Reviewed the records and discussed with her daughter at length.     Calros Rodriguez MD on 4/8/2024 at 17:04           : Yes

## 2024-06-17 RX ORDER — AMOXICILLIN 500 MG/1
500 CAPSULE ORAL
Qty: 12 | Refills: 2 | Status: ACTIVE | COMMUNITY
Start: 2024-06-17 | End: 1900-01-01

## 2024-10-23 ENCOUNTER — RESULT REVIEW (OUTPATIENT)
Age: 74
End: 2024-10-23

## 2024-10-23 ENCOUNTER — APPOINTMENT (OUTPATIENT)
Dept: CT IMAGING | Facility: CLINIC | Age: 74
End: 2024-10-23
Payer: MEDICARE

## 2024-10-23 ENCOUNTER — APPOINTMENT (OUTPATIENT)
Dept: MRI IMAGING | Facility: CLINIC | Age: 74
End: 2024-10-23
Payer: MEDICARE

## 2024-10-23 ENCOUNTER — OUTPATIENT (OUTPATIENT)
Dept: OUTPATIENT SERVICES | Facility: HOSPITAL | Age: 74
LOS: 1 days | End: 2024-10-23
Payer: MEDICARE

## 2024-10-23 DIAGNOSIS — Z96.642 PRESENCE OF LEFT ARTIFICIAL HIP JOINT: Chronic | ICD-10-CM

## 2024-10-23 DIAGNOSIS — Z98.49 CATARACT EXTRACTION STATUS, UNSPECIFIED EYE: Chronic | ICD-10-CM

## 2024-10-23 DIAGNOSIS — Z96.641 PRESENCE OF RIGHT ARTIFICIAL HIP JOINT: Chronic | ICD-10-CM

## 2024-10-23 DIAGNOSIS — Z00.8 ENCOUNTER FOR OTHER GENERAL EXAMINATION: ICD-10-CM

## 2024-10-23 PROCEDURE — 72197 MRI PELVIS W/O & W/DYE: CPT | Mod: 26,MH

## 2024-10-23 PROCEDURE — 74177 CT ABD & PELVIS W/CONTRAST: CPT | Mod: 26,MH

## 2024-10-23 PROCEDURE — 71260 CT THORAX DX C+: CPT | Mod: 26,MH

## 2024-10-29 ENCOUNTER — APPOINTMENT (OUTPATIENT)
Dept: COLORECTAL SURGERY | Facility: CLINIC | Age: 74
End: 2024-10-29
Payer: MEDICARE

## 2024-10-29 VITALS
RESPIRATION RATE: 16 BRPM | HEIGHT: 65 IN | HEART RATE: 64 BPM | SYSTOLIC BLOOD PRESSURE: 168 MMHG | DIASTOLIC BLOOD PRESSURE: 73 MMHG | TEMPERATURE: 98.2 F | OXYGEN SATURATION: 100 % | BODY MASS INDEX: 34.66 KG/M2 | WEIGHT: 208 LBS

## 2024-10-29 DIAGNOSIS — C20 MALIGNANT NEOPLASM OF RECTUM: ICD-10-CM

## 2024-10-29 DIAGNOSIS — K62.89 OTHER SPECIFIED DISEASES OF ANUS AND RECTUM: ICD-10-CM

## 2024-10-29 PROCEDURE — 99204 OFFICE O/P NEW MOD 45 MIN: CPT | Mod: 25

## 2024-10-29 PROCEDURE — 45331Z: CUSTOM

## 2024-11-06 LAB — CORE LAB BIOPSY: NORMAL

## 2024-11-20 PROCEDURE — 74177 CT ABD & PELVIS W/CONTRAST: CPT | Mod: MH

## 2024-11-20 PROCEDURE — A9585: CPT

## 2024-11-20 PROCEDURE — 72197 MRI PELVIS W/O & W/DYE: CPT | Mod: MH

## 2024-11-20 PROCEDURE — 71260 CT THORAX DX C+: CPT

## 2024-12-10 ENCOUNTER — APPOINTMENT (OUTPATIENT)
Dept: COLORECTAL SURGERY | Facility: CLINIC | Age: 74
End: 2024-12-10

## 2025-03-03 NOTE — ED PROVIDER NOTE - NSDCPRINTRESULTS_ED_ALL_ED
I will send in some tessalon perles and zofran. However, please advise her the cough can last several weeks    Patient requests all Lab and Radiology Results on their Discharge Instructions

## 2025-07-01 ENCOUNTER — APPOINTMENT (OUTPATIENT)
Dept: COLORECTAL SURGERY | Facility: CLINIC | Age: 75
End: 2025-07-01
Payer: MEDICARE

## 2025-07-01 PROCEDURE — 99213 OFFICE O/P EST LOW 20 MIN: CPT | Mod: 25

## 2025-07-01 PROCEDURE — 45330 DIAGNOSTIC SIGMOIDOSCOPY: CPT

## 2025-07-22 ENCOUNTER — OUTPATIENT (OUTPATIENT)
Dept: OUTPATIENT SERVICES | Facility: HOSPITAL | Age: 75
LOS: 1 days | End: 2025-07-22

## 2025-07-22 VITALS
OXYGEN SATURATION: 100 % | RESPIRATION RATE: 16 BRPM | TEMPERATURE: 98 F | HEART RATE: 71 BPM | WEIGHT: 184.09 LBS | DIASTOLIC BLOOD PRESSURE: 79 MMHG | SYSTOLIC BLOOD PRESSURE: 150 MMHG | HEIGHT: 62.5 IN

## 2025-07-22 DIAGNOSIS — K62.89 OTHER SPECIFIED DISEASES OF ANUS AND RECTUM: ICD-10-CM

## 2025-07-22 DIAGNOSIS — Z96.641 PRESENCE OF RIGHT ARTIFICIAL HIP JOINT: Chronic | ICD-10-CM

## 2025-07-22 DIAGNOSIS — Z98.49 CATARACT EXTRACTION STATUS, UNSPECIFIED EYE: Chronic | ICD-10-CM

## 2025-07-22 DIAGNOSIS — Z98.890 OTHER SPECIFIED POSTPROCEDURAL STATES: Chronic | ICD-10-CM

## 2025-07-22 DIAGNOSIS — Z96.642 PRESENCE OF LEFT ARTIFICIAL HIP JOINT: Chronic | ICD-10-CM

## 2025-07-22 DIAGNOSIS — I10 ESSENTIAL (PRIMARY) HYPERTENSION: ICD-10-CM

## 2025-07-22 LAB
ANION GAP SERPL CALC-SCNC: 11 MMOL/L — SIGNIFICANT CHANGE UP (ref 7–14)
ANISOCYTOSIS BLD QL: SLIGHT — SIGNIFICANT CHANGE UP
BASOPHILS # BLD AUTO: 0.02 K/UL — SIGNIFICANT CHANGE UP (ref 0–0.2)
BASOPHILS NFR BLD AUTO: 0.5 % — SIGNIFICANT CHANGE UP (ref 0–2)
BLD GP AB SCN SERPL QL: NEGATIVE — SIGNIFICANT CHANGE UP
BUN SERPL-MCNC: 22 MG/DL — SIGNIFICANT CHANGE UP (ref 7–23)
CALCIUM SERPL-MCNC: 10.1 MG/DL — SIGNIFICANT CHANGE UP (ref 8.4–10.5)
CHLORIDE SERPL-SCNC: 109 MMOL/L — HIGH (ref 98–107)
CO2 SERPL-SCNC: 25 MMOL/L — SIGNIFICANT CHANGE UP (ref 22–31)
CREAT SERPL-MCNC: 0.78 MG/DL — SIGNIFICANT CHANGE UP (ref 0.5–1.3)
EGFR: 80 ML/MIN/1.73M2 — SIGNIFICANT CHANGE UP
EGFR: 80 ML/MIN/1.73M2 — SIGNIFICANT CHANGE UP
EOSINOPHIL # BLD AUTO: 0.05 K/UL — SIGNIFICANT CHANGE UP (ref 0–0.5)
EOSINOPHIL NFR BLD AUTO: 1.2 % — SIGNIFICANT CHANGE UP (ref 0–6)
FOLATE SERPL-MCNC: >20 NG/ML — SIGNIFICANT CHANGE UP
GLUCOSE SERPL-MCNC: 109 MG/DL — HIGH (ref 70–99)
HCT VFR BLD CALC: 35 % — SIGNIFICANT CHANGE UP (ref 34.5–45)
HGB BLD-MCNC: 11 G/DL — LOW (ref 11.5–15.5)
IMM GRANULOCYTES NFR BLD AUTO: 1 % — HIGH (ref 0–0.9)
LYMPHOCYTES # BLD AUTO: 0.31 K/UL — LOW (ref 1–3.3)
LYMPHOCYTES # BLD AUTO: 7.7 % — LOW (ref 13–44)
MANUAL SMEAR VERIFICATION: SIGNIFICANT CHANGE UP
MCHC RBC-ENTMCNC: 31.4 G/DL — LOW (ref 32–36)
MCHC RBC-ENTMCNC: 31.5 PG — SIGNIFICANT CHANGE UP (ref 27–34)
MCV RBC AUTO: 100.3 FL — HIGH (ref 80–100)
MONOCYTES # BLD AUTO: 0.2 K/UL — SIGNIFICANT CHANGE UP (ref 0–0.9)
MONOCYTES NFR BLD AUTO: 5 % — SIGNIFICANT CHANGE UP (ref 2–14)
NEUTROPHILS # BLD AUTO: 3.41 K/UL — SIGNIFICANT CHANGE UP (ref 1.8–7.4)
NEUTROPHILS NFR BLD AUTO: 84.6 % — HIGH (ref 43–77)
PLAT MORPH BLD: NORMAL — SIGNIFICANT CHANGE UP
PLATELET # BLD AUTO: 100 K/UL — LOW (ref 150–400)
POTASSIUM SERPL-MCNC: 4 MMOL/L — SIGNIFICANT CHANGE UP (ref 3.5–5.3)
POTASSIUM SERPL-SCNC: 4 MMOL/L — SIGNIFICANT CHANGE UP (ref 3.5–5.3)
RBC # BLD: 3.49 M/UL — LOW (ref 3.8–5.2)
RBC # FLD: 14.8 % — HIGH (ref 10.3–14.5)
RBC BLD AUTO: ABNORMAL
RETICS #: 66.1 K/UL — SIGNIFICANT CHANGE UP (ref 25–125)
RETICS/RBC NFR: 1.9 % — SIGNIFICANT CHANGE UP (ref 0.5–2.5)
RH IG SCN BLD-IMP: POSITIVE — SIGNIFICANT CHANGE UP
RH IG SCN BLD-IMP: POSITIVE — SIGNIFICANT CHANGE UP
SODIUM SERPL-SCNC: 145 MMOL/L — SIGNIFICANT CHANGE UP (ref 135–145)
VIT B12 SERPL-MCNC: 1236 PG/ML — SIGNIFICANT CHANGE UP (ref 232–1245)
WBC # BLD: 4.03 K/UL — SIGNIFICANT CHANGE UP (ref 3.8–10.5)
WBC # FLD AUTO: 4.03 K/UL — SIGNIFICANT CHANGE UP (ref 3.8–10.5)

## 2025-07-23 PROBLEM — E66.01 MORBID (SEVERE) OBESITY DUE TO EXCESS CALORIES: Chronic | Status: INACTIVE | Noted: 2021-02-12 | Resolved: 2025-07-22

## 2025-07-28 ENCOUNTER — APPOINTMENT (OUTPATIENT)
Dept: COLORECTAL SURGERY | Facility: HOSPITAL | Age: 75
End: 2025-07-28
Payer: MEDICARE

## 2025-07-28 ENCOUNTER — RESULT REVIEW (OUTPATIENT)
Age: 75
End: 2025-07-28

## 2025-07-28 ENCOUNTER — TRANSCRIPTION ENCOUNTER (OUTPATIENT)
Age: 75
End: 2025-07-28

## 2025-07-28 ENCOUNTER — INPATIENT (INPATIENT)
Facility: HOSPITAL | Age: 75
LOS: 0 days | Discharge: HOME | End: 2025-07-29
Attending: STUDENT IN AN ORGANIZED HEALTH CARE EDUCATION/TRAINING PROGRAM | Admitting: STUDENT IN AN ORGANIZED HEALTH CARE EDUCATION/TRAINING PROGRAM
Payer: MEDICARE

## 2025-07-28 VITALS
SYSTOLIC BLOOD PRESSURE: 140 MMHG | DIASTOLIC BLOOD PRESSURE: 86 MMHG | OXYGEN SATURATION: 100 % | WEIGHT: 184.09 LBS | HEART RATE: 70 BPM | HEIGHT: 62.5 IN | TEMPERATURE: 98 F | RESPIRATION RATE: 16 BRPM

## 2025-07-28 DIAGNOSIS — K62.89 OTHER SPECIFIED DISEASES OF ANUS AND RECTUM: ICD-10-CM

## 2025-07-28 DIAGNOSIS — Z98.890 OTHER SPECIFIED POSTPROCEDURAL STATES: Chronic | ICD-10-CM

## 2025-07-28 DIAGNOSIS — Z96.641 PRESENCE OF RIGHT ARTIFICIAL HIP JOINT: Chronic | ICD-10-CM

## 2025-07-28 DIAGNOSIS — Z96.642 PRESENCE OF LEFT ARTIFICIAL HIP JOINT: Chronic | ICD-10-CM

## 2025-07-28 DIAGNOSIS — Z98.49 CATARACT EXTRACTION STATUS, UNSPECIFIED EYE: Chronic | ICD-10-CM

## 2025-07-28 LAB
GLUCOSE BLDC GLUCOMTR-MCNC: 67 MG/DL — LOW (ref 70–99)
GLUCOSE BLDC GLUCOMTR-MCNC: 71 MG/DL — SIGNIFICANT CHANGE UP (ref 70–99)

## 2025-07-28 PROCEDURE — 45300 PROCTOSIGMOIDOSCOPY DX: CPT

## 2025-07-28 PROCEDURE — 45172 EXC RECT TUM TRANSANAL FULL: CPT | Mod: 59

## 2025-07-28 PROCEDURE — 45123 PARTIAL PROCTECTOMY: CPT

## 2025-07-28 PROCEDURE — 88307 TISSUE EXAM BY PATHOLOGIST: CPT | Mod: 26

## 2025-07-28 DEVICE — SURGICEL 2 X 14": Type: IMPLANTABLE DEVICE | Status: FUNCTIONAL

## 2025-07-28 DEVICE — SURGIFOAM 8 X 12.25CM X 2MM: Type: IMPLANTABLE DEVICE | Status: FUNCTIONAL

## 2025-07-28 RX ORDER — ATORVASTATIN CALCIUM 80 MG/1
10 TABLET, FILM COATED ORAL AT BEDTIME
Refills: 0 | Status: DISCONTINUED | OUTPATIENT
Start: 2025-07-28 | End: 2025-07-29

## 2025-07-28 RX ORDER — SODIUM CHLORIDE 9 G/1000ML
1000 INJECTION, SOLUTION INTRAVENOUS
Refills: 0 | Status: DISCONTINUED | OUTPATIENT
Start: 2025-07-28 | End: 2025-07-29

## 2025-07-28 RX ORDER — CELECOXIB 50 MG/1
400 CAPSULE ORAL ONCE
Refills: 0 | Status: COMPLETED | OUTPATIENT
Start: 2025-07-28 | End: 2025-07-28

## 2025-07-28 RX ORDER — IBUPROFEN 200 MG
400 TABLET ORAL EVERY 6 HOURS
Refills: 0 | Status: DISCONTINUED | OUTPATIENT
Start: 2025-07-28 | End: 2025-07-29

## 2025-07-28 RX ORDER — HYDROMORPHONE/SOD CHLOR,ISO/PF 2 MG/10 ML
0.5 SYRINGE (ML) INJECTION
Refills: 0 | Status: DISCONTINUED | OUTPATIENT
Start: 2025-07-28 | End: 2025-07-28

## 2025-07-28 RX ORDER — ACETAMINOPHEN 500 MG/5ML
975 LIQUID (ML) ORAL EVERY 6 HOURS
Refills: 0 | Status: DISCONTINUED | OUTPATIENT
Start: 2025-07-28 | End: 2025-07-29

## 2025-07-28 RX ORDER — LOSARTAN POTASSIUM 100 MG/1
100 TABLET, FILM COATED ORAL DAILY
Refills: 0 | Status: DISCONTINUED | OUTPATIENT
Start: 2025-07-28 | End: 2025-07-29

## 2025-07-28 RX ORDER — GABAPENTIN 400 MG/1
600 CAPSULE ORAL ONCE
Refills: 0 | Status: COMPLETED | OUTPATIENT
Start: 2025-07-28 | End: 2025-07-28

## 2025-07-28 RX ORDER — LOSARTAN POTASSIUM 100 MG/1
1 TABLET, FILM COATED ORAL
Refills: 0 | DISCHARGE

## 2025-07-28 RX ORDER — LOSARTAN POTASSIUM 100 MG/1
25 TABLET, FILM COATED ORAL DAILY
Refills: 0 | Status: DISCONTINUED | OUTPATIENT
Start: 2025-07-28 | End: 2025-07-29

## 2025-07-28 RX ORDER — OXYCODONE HYDROCHLORIDE 30 MG/1
5 TABLET ORAL ONCE
Refills: 0 | Status: DISCONTINUED | OUTPATIENT
Start: 2025-07-28 | End: 2025-07-28

## 2025-07-28 RX ORDER — ONDANSETRON HCL/PF 4 MG/2 ML
4 VIAL (ML) INJECTION ONCE
Refills: 0 | Status: DISCONTINUED | OUTPATIENT
Start: 2025-07-28 | End: 2025-07-28

## 2025-07-28 RX ADMIN — ATORVASTATIN CALCIUM 10 MILLIGRAM(S): 80 TABLET, FILM COATED ORAL at 21:37

## 2025-07-28 RX ADMIN — SODIUM CHLORIDE 30 MILLILITER(S): 9 INJECTION, SOLUTION INTRAVENOUS at 21:39

## 2025-07-28 RX ADMIN — GABAPENTIN 600 MILLIGRAM(S): 400 CAPSULE ORAL at 13:57

## 2025-07-28 RX ADMIN — CELECOXIB 400 MILLIGRAM(S): 50 CAPSULE ORAL at 13:57

## 2025-07-29 ENCOUNTER — TRANSCRIPTION ENCOUNTER (OUTPATIENT)
Age: 75
End: 2025-07-29

## 2025-07-29 VITALS
TEMPERATURE: 98 F | HEART RATE: 69 BPM | OXYGEN SATURATION: 99 % | SYSTOLIC BLOOD PRESSURE: 123 MMHG | DIASTOLIC BLOOD PRESSURE: 65 MMHG | RESPIRATION RATE: 16 BRPM

## 2025-07-29 LAB — GLUCOSE BLDC GLUCOMTR-MCNC: 74 MG/DL — SIGNIFICANT CHANGE UP (ref 70–99)

## 2025-07-29 RX ORDER — IBUPROFEN 200 MG
1 TABLET ORAL
Qty: 0 | Refills: 0 | DISCHARGE
Start: 2025-07-29

## 2025-07-29 RX ORDER — ACETAMINOPHEN 500 MG/5ML
3 LIQUID (ML) ORAL
Qty: 0 | Refills: 0 | DISCHARGE
Start: 2025-07-29

## 2025-07-29 RX ADMIN — LOSARTAN POTASSIUM 25 MILLIGRAM(S): 100 TABLET, FILM COATED ORAL at 10:20

## 2025-07-29 RX ADMIN — Medication 400 MILLIGRAM(S): at 05:17

## 2025-07-29 RX ADMIN — Medication 400 MILLIGRAM(S): at 00:23

## 2025-07-29 RX ADMIN — Medication 975 MILLIGRAM(S): at 12:39

## 2025-07-29 RX ADMIN — Medication 400 MILLIGRAM(S): at 01:23

## 2025-07-29 RX ADMIN — LOSARTAN POTASSIUM 100 MILLIGRAM(S): 100 TABLET, FILM COATED ORAL at 10:19

## 2025-07-29 RX ADMIN — Medication 400 MILLIGRAM(S): at 11:39

## 2025-07-29 RX ADMIN — Medication 975 MILLIGRAM(S): at 05:17

## 2025-07-29 RX ADMIN — Medication 400 MILLIGRAM(S): at 06:17

## 2025-07-29 RX ADMIN — Medication 400 MILLIGRAM(S): at 12:39

## 2025-07-29 RX ADMIN — Medication 975 MILLIGRAM(S): at 06:17

## 2025-07-29 RX ADMIN — Medication 975 MILLIGRAM(S): at 01:23

## 2025-07-29 RX ADMIN — Medication 975 MILLIGRAM(S): at 11:39

## 2025-07-29 RX ADMIN — Medication 975 MILLIGRAM(S): at 00:23

## 2025-07-30 ENCOUNTER — NON-APPOINTMENT (OUTPATIENT)
Age: 75
End: 2025-07-30

## 2025-07-30 PROBLEM — C20 MALIGNANT NEOPLASM OF RECTUM: Chronic | Status: ACTIVE | Noted: 2025-07-22

## 2025-08-01 LAB — SURGICAL PATHOLOGY STUDY: SIGNIFICANT CHANGE UP

## 2025-08-12 ENCOUNTER — APPOINTMENT (OUTPATIENT)
Dept: COLORECTAL SURGERY | Facility: CLINIC | Age: 75
End: 2025-08-12
Payer: MEDICARE

## 2025-08-12 DIAGNOSIS — C20 MALIGNANT NEOPLASM OF RECTUM: ICD-10-CM

## 2025-08-12 PROCEDURE — 99024 POSTOP FOLLOW-UP VISIT: CPT

## (undated) DEVICE — POSITIONER FOAM EGG CRATE ULNAR 2PCS (PINK)

## (undated) DEVICE — ELCTR BOVIE TIP BLADE INSULATED 2.8" EDGE WITH SAFETY

## (undated) DEVICE — SUT VICRYL 2-0 27" SH UNDYED

## (undated) DEVICE — LONE STAR RETRACTOR SQUARE 14.1CMX14.1CM DISP

## (undated) DEVICE — WARMING BLANKET UPPER ADULT

## (undated) DEVICE — SUT ETHIBOND 0 30" CT-1 GREEN

## (undated) DEVICE — LONE STAR ELASTIC STAY HOOK 5MM SHARP

## (undated) DEVICE — ANESTHESIA CIRCUIT ADULT HMEF

## (undated) DEVICE — SOL IRR POUR NS 0.9% 500ML

## (undated) DEVICE — SUT PDS II 1 48" TP-1

## (undated) DEVICE — SUT MONOCRYL 4-0 27" PS-2 UNDYED

## (undated) DEVICE — SOL IRR POUR H2O 250ML

## (undated) DEVICE — SUCTION YANKAUER OPEN TIP NO VENT CURVE

## (undated) DEVICE — SUT MONOCRYL 4-0 18" P-3 UNDYED

## (undated) DEVICE — DRAPE WARMING SOLUTION 44 X 44"

## (undated) DEVICE — LABELS BLANK W PEN

## (undated) DEVICE — ELCTR BOVIE BLADE 3/4" EXTENDED LENGTH 6"

## (undated) DEVICE — ELCTR BOVIE PENCIL SMOKE EVACUATION

## (undated) DEVICE — DRSG CURITY GAUZE SPONGE 4 X 4" 12-PLY NON-STERILE

## (undated) DEVICE — DRAPE LAPAROTOMY TRANSVERSE

## (undated) DEVICE — BIOSEL SIGMOIDOSCOPE KIT DISP

## (undated) DEVICE — TAPE SILK 2"

## (undated) DEVICE — DRSG TAPE MEDIPORE 6"

## (undated) DEVICE — VENODYNE/SCD SLEEVE CALF MEDIUM

## (undated) DEVICE — PACK MINOR WITH LAP

## (undated) DEVICE — TUBING SUCTION NONCONDUCTIVE 6MM X 12FT